# Patient Record
Sex: FEMALE | Race: BLACK OR AFRICAN AMERICAN | NOT HISPANIC OR LATINO | URBAN - METROPOLITAN AREA
[De-identification: names, ages, dates, MRNs, and addresses within clinical notes are randomized per-mention and may not be internally consistent; named-entity substitution may affect disease eponyms.]

---

## 2017-05-19 ENCOUNTER — EMERGENCY (EMERGENCY)
Facility: HOSPITAL | Age: 52
LOS: 1 days | Discharge: HOME | End: 2017-05-19
Attending: EMERGENCY MEDICINE | Admitting: EMERGENCY MEDICINE
Payer: COMMERCIAL

## 2017-05-19 VITALS
DIASTOLIC BLOOD PRESSURE: 85 MMHG | HEART RATE: 66 BPM | SYSTOLIC BLOOD PRESSURE: 130 MMHG | OXYGEN SATURATION: 99 % | RESPIRATION RATE: 17 BRPM | TEMPERATURE: 98 F

## 2017-05-19 DIAGNOSIS — Z88.0 ALLERGY STATUS TO PENICILLIN: ICD-10-CM

## 2017-05-19 DIAGNOSIS — Z79.899 OTHER LONG TERM (CURRENT) DRUG THERAPY: ICD-10-CM

## 2017-05-19 DIAGNOSIS — H11.32 CONJUNCTIVAL HEMORRHAGE, LEFT EYE: ICD-10-CM

## 2017-05-19 DIAGNOSIS — Z79.2 LONG TERM (CURRENT) USE OF ANTIBIOTICS: ICD-10-CM

## 2017-05-19 DIAGNOSIS — H57.8 OTHER SPECIFIED DISORDERS OF EYE AND ADNEXA: ICD-10-CM

## 2017-05-19 PROCEDURE — 99282 EMERGENCY DEPT VISIT SF MDM: CPT

## 2017-05-19 NOTE — ED PROVIDER NOTE - CHPI ED SYMPTOMS NEG
no purulent drainage/no photophobia/no foreign body/no drainage/no blurred vision/no double vision/no discharge/no pain/no itching/no eye lid swelling

## 2017-05-19 NOTE — ED ADULT TRIAGE NOTE - CHIEF COMPLAINT QUOTE
Pt c/o left eye redness since waking today. Endorses hx of htn, denies pain or visual disturbances. Appears like small ruptured vessel on sclera, bp in triage 130/85

## 2017-05-19 NOTE — ED ADULT NURSE NOTE - OBJECTIVE STATEMENT
Pt reports redness to left eye that she noticed this morning. Pt denies any pain, denies trauma or injury

## 2017-05-19 NOTE — ED PROVIDER NOTE - OBJECTIVE STATEMENT
52 yo to ED after noticing "red spot" in white of her left eye. Denies trauma/ha/straining.  No dc. no visual disturbance,

## 2017-05-19 NOTE — ED PROVIDER NOTE - EYE, LEFT
small jason left lateral sclera. no actyive bleeding. Neg hyphema/hypopyon./clear/pupils equal, round, and reactive to light
